# Patient Record
Sex: FEMALE | Employment: UNEMPLOYED | URBAN - NONMETROPOLITAN AREA
[De-identification: names, ages, dates, MRNs, and addresses within clinical notes are randomized per-mention and may not be internally consistent; named-entity substitution may affect disease eponyms.]

---

## 2019-06-11 ENCOUNTER — OFFICE VISIT (OUTPATIENT)
Dept: FAMILY MEDICINE CLINIC | Age: 74
End: 2019-06-11
Payer: COMMERCIAL

## 2019-06-11 VITALS
TEMPERATURE: 97.9 F | DIASTOLIC BLOOD PRESSURE: 88 MMHG | BODY MASS INDEX: 29.19 KG/M2 | RESPIRATION RATE: 16 BRPM | HEART RATE: 82 BPM | SYSTOLIC BLOOD PRESSURE: 138 MMHG | WEIGHT: 171 LBS | OXYGEN SATURATION: 95 % | HEIGHT: 64 IN

## 2019-06-11 DIAGNOSIS — L23.7 POISON IVY: Primary | ICD-10-CM

## 2019-06-11 PROCEDURE — 99213 OFFICE O/P EST LOW 20 MIN: CPT | Performed by: NURSE PRACTITIONER

## 2019-06-11 RX ORDER — CEPHALEXIN 500 MG/1
500 CAPSULE ORAL 4 TIMES DAILY
Qty: 14 CAPSULE | Refills: 0 | Status: SHIPPED | OUTPATIENT
Start: 2019-06-11

## 2019-06-11 RX ORDER — DIPHENHYDRAMINE HCL 25 MG
25 CAPSULE ORAL EVERY 6 HOURS PRN
COMMUNITY

## 2019-06-11 RX ORDER — PREDNISONE 10 MG/1
TABLET ORAL
Qty: 30 TABLET | Refills: 0 | Status: SHIPPED | OUTPATIENT
Start: 2019-06-11 | End: 2019-06-23

## 2019-06-11 RX ORDER — PHENAZOPYRIDINE HYDROCHLORIDE 200 MG/1
200 TABLET, FILM COATED ORAL 3 TIMES DAILY PRN
COMMUNITY

## 2019-06-11 RX ORDER — CIPROFLOXACIN 500 MG/1
1 TABLET, FILM COATED ORAL DAILY
Refills: 0 | COMMUNITY
Start: 2019-06-04

## 2019-06-11 RX ORDER — VENLAFAXINE 37.5 MG/1
37.5 TABLET ORAL NIGHTLY
COMMUNITY

## 2019-06-11 NOTE — PROGRESS NOTES
Subjective:  Chief Complaint   Patient presents with    Rash     on left arm, chin. HPI:  Patient states rash started 7 days ago. Patient was outside about 1 week ago and exposed to poison ivy. The rash is pruritic in nature. No pain. The patient denies any other food, drug and or environmental allergens. Denies throat swelling or dyspnea. Denies fever or chills. Patient has been using OTC medications without improvement in symptoms. The patient has had previous episodes of poison ivy and this feels the same. Currently being treated for UTI and has been on Cipro for about 1 week per her report. She thinks she has about 3 days left of the antibiotics. No more dysuria. She states that otherwise she is healthy with no chronic medical problems. ROS:  Positive and pertinent negatives as per HPI. All other systems are reviewed and negative. Current Outpatient Medications:     ciprofloxacin (CIPRO) 500 MG tablet, Take 1 tablet by mouth daily, Disp: , Rfl: 0    venlafaxine (EFFEXOR) 37.5 MG tablet, Take 37.5 mg by mouth nightly, Disp: , Rfl:     phenazopyridine (PYRIDIUM) 200 MG tablet, Take 200 mg by mouth 3 times daily as needed for Pain, Disp: , Rfl:     diphenhydrAMINE (BENADRYL) 25 MG capsule, Take 25 mg by mouth every 6 hours as needed for Itching, Disp: , Rfl:     cephALEXin (KEFLEX) 500 MG capsule, Take 1 capsule by mouth 4 times daily, Disp: 14 capsule, Rfl: 0    predniSONE (DELTASONE) 10 MG tablet, Take 4 tablets by mouth daily for 3 days, THEN 3 tablets daily for 3 days, THEN 2 tablets daily for 3 days, THEN 1 tablet daily for 3 days. , Disp: 30 tablet, Rfl: 0    triamcinolone (KENALOG) 0.1 % ointment, Apply topically 2 times daily for 7 days, Disp: 30 g, Rfl: 0   Allergies   Allergen Reactions    Demerol Hcl [Meperidine] Nausea Only        Objective:  Vitals:    06/11/19 1323   BP: 138/88   Site: Right Upper Arm   Position: Sitting   Cuff Size: Large Adult   Pulse: 82   Resp: 16   Temp: 97.9 °F (36.6 °C)   TempSrc: Temporal   SpO2: 95%   Weight: 171 lb (77.6 kg)   Height: 5' 4\" (1.626 m)        Exam:  Const: Appears healthy and well developed. No signs of acute distress present. Vitals reviewed per triage. Head/Face: Normocephalic, atraumatic. Facies is symmetric. ENMT:  Nares are patent. Buccal mucosa is moist.  No inflammation or edema of the posterior oropharynx. Neck: Trachea midline. Resp: No respiratory distress. Lungs clear to auscultation bilaterally all lung fieds. Musculo: Patient moves extremities without pain or limitation. Skin: Skin is warm and dry. The patient has multiple linear vesicular areas noted to the trunk and extremities. There is a rash over the left antecubital region with concern for a cellulitic involvement. She also has lesions to the right cheek and submandibular area. There is no evidence of petechiae or purpura rash. No pustules or open areas or signs of secondary infection noted. No target lesions. Neuro: Alert and oriented x3. Speech is articulate and fluent. Psych: Mood/Affect: Patient's mood and affect is appropriate to situation. Cristino Chu was seen today for rash. Diagnoses and all orders for this visit:    Poison ivy  -     cephALEXin (KEFLEX) 500 MG capsule; Take 1 capsule by mouth 4 times daily  -     predniSONE (DELTASONE) 10 MG tablet; Take 4 tablets by mouth daily for 3 days, THEN 3 tablets daily for 3 days, THEN 2 tablets daily for 3 days, THEN 1 tablet daily for 3 days. -     triamcinolone (KENALOG) 0.1 % ointment; Apply topically 2 times daily for 7 days      Make sure to take probiotics with antibiotics and she will be onto for a few days. No anti-inflammatories the prednisone. She can continue the Benadryl. Reviewed signs or symptoms that warrant further evaluation.   Seen By:    SHIRIN Shabazz - CNP